# Patient Record
(demographics unavailable — no encounter records)

---

## 2025-05-06 NOTE — PHYSICAL EXAM
[Alert] : alert [No Acute Distress] : in no acute distress [Sclera] : the sclera and conjunctiva were normal [Normal Outer Ear/Nose] : the ears and nose were normal in appearance [Supple] : the neck was supple [No Respiratory Distress] : no respiratory distress [No Acc Muscle Use] : no accessory muscle use [Respiration, Rhythm And Depth] : normal respiratory rhythm and effort [Normal S1, S2] : normal S1 and S2 [Heart Rate And Rhythm] : heart rate was normal and rhythm regular [Bowel Sounds] : normal bowel sounds [Abdomen Tenderness] : non-tender [Abdomen Soft] : soft [Normal Gait] : abnormal gait [No Clubbing, Cyanosis] : no clubbing or cyanosis of the fingernails [Motor Tone] : muscle strength and tone were normal [No Focal Deficits] : no focal deficits [Normal Affect] : the affect was normal [Normal Mood] : the mood was normal

## 2025-05-06 NOTE — HISTORY OF PRESENT ILLNESS
[No falls in past year] : Patient reported no falls in the past year [0] : 2) Feeling down, depressed, or hopeless: Not at all (0) [PHQ-2 Negative - No further assessment needed] : PHQ-2 Negative - No further assessment needed [With Patient/Caregiver] : , with patient/caregiver [FreeTextEntry1] : PCP Dr. Khalida Zambrano   [facility] - [facility] Phone  [phone]  Gastro Dr. Benton Ly   Mr. FLOWER JONES is a 60 year old man with pmhx of migraines, essential tremors on propanolol, anxiety on citalopram, neuropathy, alcohol use disorder s/p naltrexone injection, cirrhosis with esophageal varices, anemia, gait instability ambulates with cane who presents for geriatric assessment.  Patient is seen at home of the [facility] living.   Patient is post discharge for [facility] for metabolic encephalopathy from cirrhosis. He is taking medications has prescribed. He had gout two weeks ago, has since resolved. He has follow up appt with rheumatologist and hepatologist on may 22, records to be faxed over.   Reviewed outside records, updated chart with history. He is taking medications as prescribed. Sent refill of medications. He needs refill for medication sent to facility, done today.   He has no complaints.   Social hx: Biochem professor. 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 no ilicit drugs or tobacco.  [[id number]] : 0 [AdvancecareDate] : 05/25 [FreeTextEntry4] : Has HCP, copy of form to be scanned to chart.

## 2025-05-06 NOTE — ASSESSMENT
[FreeTextEntry1] : Gait instability - start PT.   HM - healthy meals and snacks, and  physical activity as tolerated for weight maintenance. Vaccine information to be faxed over. Ophthalmology referral provided. Orders and referral provided as below. Outside records to be faxed over.    Patient is here for episodic care. All patient questions answered today and understood by patient. Henceforth, Patient to schedule follow up appointments to discuss results and update plan of care, and if new symptoms, questions, renewals or health concerns.   I have spent a total of 60 minutes or greater to prepare to see and discuss with the patient, obtaining and reviewing history and records, documenting physical examination, explaining the diagnosis, prognosis and treatment plan with the patient. I also have spent the time ordering studies and testing, interpreting results, medicine reconciliation, subspecialty consultation and documentation as above. Excluding time spent on separately billable services.

## 2025-05-13 NOTE — ASSESSMENT
[FreeTextEntry1] : Cirrhosis (571.5) (K74.60)Varices of esophagus determined by endoscopy (456.1) (I85.00) hx of esophageal varices. c/w propanolol. he is UTD with endoscopy and colonoscopy. monitoring for signs of bleeding. advised on judicious use of NSAIDs. checking labs to assess MELD score. c/w lactulose and bowel regimen, has 3+ bms a day.  Alcohol dependence (303.90) (F10.20) s/p nalteoxine injection. follow up with hepatologist for repeat injection, or consider pills.  records to be faxed over.  Essential tremor (333.1) (G25.0) stable. c/w propanolol.   Gout, arthritis (274.00) (M10.9) stop prednisone. follow up with rheumatologist. c/w allopurinol.    uric acid level 6.8.   Anemia, unspecified type (285.9) (D64.9) c/w multi vitamin. iron deficiency, consider iron infusion with hematologist.   Neuropathy (355.9) (G62.9) c/w gabapentin.  Knee pain - xray ordered. acetaminophen 650mg q8h prn.   Gait instability - c/w PT. ambulates with cane.   Leukocytosis - chronic, patient to see hematologist.   HM - healthy meals and snacks, and  physical activity as tolerated for weight maintenance. Vaccine information to be faxed over. Ophthalmology referral provided. Orders and referral provided as below. Outside records to be faxed over.    Patient is here for episodic care. All patient questions answered today and understood by patient. Henceforth, Patient to schedule follow up appointments to discuss results and update plan of care, and if new symptoms, questions, renewals or health concerns.   I have spent a total of 60 minutes or greater to prepare to see and discuss with the patient, obtaining and reviewing history and records, documenting physical examination, explaining the diagnosis, prognosis and treatment plan with the patient. I also have spent the time ordering studies and testing, interpreting results, medicine reconciliation, subspecialty consultation and documentation as above. Excluding time spent on separately billable services.

## 2025-05-13 NOTE — HISTORY OF PRESENT ILLNESS
[FreeTextEntry1] : Care team:  Prior PCP Dr. Khalida Zambrano  [facility] Phone  [phone]  Gastro/Hep  Dr. Benton Ly   Mr. FLOWER JONES is a 60 year old man with pmhx of migraines, essential tremors on propanolol, anxiety on citalopram, neuropathy, alcohol use disorder s/p naltrexone injection, cirrhosis with esophageal varices, hx of iron def anemia, gait instability ambulates with cane who presents for geriatric assessment.  Patient is seen at home of the [facility].   He complains of right knee pain. We discusseed past labs. He wants to take acetaminophen for knee pain, we discussed 650mg q8h prn option given he also uses this for headaches/migraines.   We discussed recent xray with heling toe fracture. we discussed adding vitamin d. folate level is elevated, stopping this and b12.   His liver function is improving. we are stopping rifaximin 550mg due to cost/prior authorization, can be restarted with hepatologist. we discussed alcohol injection with hepatologist.   patient was on steroids which can elevated wbc, but this is chronic. patient to see hematologist, also for iron infusion.   Patient is post discharge for [facility] for metabolic encephalopathy from cirrhosis. He is taking medications has prescribed. He had gout two weeks ago, has since resolved. He has follow up appt with rheumatologist and hepatologist on may 22, records to be faxed over.   Social hx: Biochem professor. 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 no ilicit drugs or tobacco.  [[id number]] : 0 [AdvancecareDate] : 05/25 [FreeTextEntry4] : Has HCP, copy of form to be scanned to chart.

## 2025-06-18 NOTE — HISTORY OF PRESENT ILLNESS
[Preoperative Visit] : for a medical evaluation prior to surgery [With Patient/Caregiver] : , with patient/caregiver [No falls in past year] : Patient reported no falls in the past year [Completely Independent] : Completely independent. [0] : 2) Feeling down, depressed, or hopeless: Not at all (0) [PHQ-2 Negative - No further assessment needed] : PHQ-2 Negative - No further assessment needed [Scheduled Procedure ___] : a [unfilled] [Surgeon Name ___] : surgeon: [unfilled] [Electrocardiogram] : ~T an ECG ~C was performed [Fair] : Fair [Fever] : no fever [Chills] : no chills [Dyspnea] : no dyspnea [FreeTextEntry1] : Care team:  Prior PCP Dr. Khalida Zambrano  [facility] - [facility] Phone  [phone]  Gastro/Hep  Dr. Benton Ly   Mr. FLOWER JONES is a 60 year old man with pmhx of migraines, essential tremors on propanolol, anxiety on citalopram, neuropathy, alcohol use disorder s/p naltrexone injection, cirrhosis withs, hx of iron def anemia, gait instability ambulates with cane, chronic joint pain, toe fracture who presents for pre-op.    He has follows with rheumatologist and hepatologist at [facility]. He is planning for endoscopy. EGD - april 2025- showing esophagitis, without esophageal varices.   Patient advised to ask surgeon about use of NSAIDs and acetaminophen for chronic pain. He is now on acamprosate. He wants try tramadol prn pain.   Patient has had surgery in the past, denied any adverse reaction to anesthesia, nor any bleed and clotting. Patient denied any dyspnea, chest pain, lightheadedness, nor wheezing with activity. He ambulates with walker due to pain in his foot.   Social hx: Biochem professor. 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 no ilicit drugs or tobacco.   [AdvancecareDate] : 05/25 [FreeTextEntry4] : HCP scanned into chart.  [[id number]] : 0

## 2025-06-18 NOTE — ASSESSMENT
[Procedure Low Risk] : the procedure risk is low [Patient Low Risk] : the patient is a low surgical risk [Optimized for Surgery] : the patient is optimized for surgery [As per surgery] : as per surgery [FreeTextEntry1] : preop - Patient is considered low risk for low risk procedure. No contraindication for planned procedure. Patient last INR on may 2025 was normal 1.09. Patient to continue off nsaids prior to surgery. He can use Tylenol as needed for pain. ECG - sinus bradycardia, hr 59, normal intervals, low voltate in precordial leads. mildy abnormal.   Cirrhosis (571.5) (K74.60) with esophagitis. c/w propanolol. pending repeat endoscopy for esophagitis. . monitoring for signs of bleeding. use of NSAIDs and acetaminophen prn to be discussed with hepatologist. MELD score improved since hospitalization . c/w lactulose and bowel regimen, has 3+ bms a day.  hx of toe fracture and rib fracture - ordered for bone density test.   Alcohol dependence (303.90) (F10.20) s/p nalteoxine injection. c/w acamprosate as per hepatology. alcohol center appt to be scheduled.   Essential tremor (333.1) (G25.0) stable. c/w propranolol.   Gout, arthritis (274.00) (M10.9) s/p oral steroids. uric acid at goal, continue allopurinol 400mg daily.  follow up with rheumatologist for assessment if pseudogout vs gout, and possible need for intrarticular injection. patient also has chronic pain across multiple joints, discussed fibromyalgia, neuropathy, vs complex regional pain disorder. c/w gabapentin.   Anemia, unspecified type (285.9) (D64.9)c/w multi vitamin. follow up with hematologist.   Gait instability - c/w PT. ambulates with cane.   Leukocytosis - chronic, patient to see hematologist. worsened by recent steroid use.   Risk and benefits of Prevnar vaccination discussed today. Patient opted for vaccination today. All questions answered.  HM - healthy meals and snacks, and  physical activity as tolerated for weight maintenance. Vaccine information to be faxed over. Ophthalmology referral provided. Orders and referral provided as below. Outside records to be faxed over.    Patient is here for episodic care. All patient questions answered today and understood by patient. Henceforth, Patient to schedule follow up appointments to discuss results and update plan of care, and if new symptoms, questions, renewals or health concerns.

## 2025-06-18 NOTE — HISTORY OF PRESENT ILLNESS
[Preoperative Visit] : for a medical evaluation prior to surgery [With Patient/Caregiver] : , with patient/caregiver [No falls in past year] : Patient reported no falls in the past year [Completely Independent] : Completely independent. [0] : 2) Feeling down, depressed, or hopeless: Not at all (0) [PHQ-2 Negative - No further assessment needed] : PHQ-2 Negative - No further assessment needed [Scheduled Procedure ___] : a [unfilled] [Surgeon Name ___] : surgeon: [unfilled] [Electrocardiogram] : ~T an ECG ~C was performed [Fair] : Fair [Fever] : no fever [Chills] : no chills [Dyspnea] : no dyspnea [FreeTextEntry1] : Care team:  Prior PCP Dr. Khalida Zambrano  [facility] - [facility] Phone  [phone]  Gastro/Hep  Dr. Benton Ly   Mr. FLOWER JONES is a 60 year old man with pmhx of migraines, essential tremors on propanolol, anxiety on citalopram, neuropathy, alcohol use disorder s/p naltrexone injection, cirrhosis withs, hx of iron def anemia, gait instability ambulates with cane, chronic joint pain, toe fracture who presents for pre-op.    He has follows with rheumatologist and hepatologist at [facility]. He is planning for endoscopy. EGD - april 2025- showing esophagitis, without esophageal varices.   Patient advised to ask surgeon about use of NSAIDs and acetaminophen for chronic pain. He is now on acamprosate. He wants try tramadol prn pain.   Patient has had surgery in the past, denied any adverse reaction to anesthesia, nor any bleed and clotting. Patient denied any dyspnea, chest pain, lightheadedness, nor wheezing with activity. He ambulates with walker due to pain in his foot.   Social hx: Biochem professor. 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 no ilicit drugs or tobacco.   [AdvancecareDate] : 05/25 [FreeTextEntry4] : HCP scanned into chart.  [[id number]] : 0

## 2025-06-18 NOTE — PHYSICAL EXAM
[Alert] : alert [Sclera] : the sclera and conjunctiva were normal [Normal Outer Ear/Nose] : the ears and nose were normal in appearance [No Respiratory Distress] : no respiratory distress [No Acc Muscle Use] : no accessory muscle use [No Focal Deficits] : no focal deficits [Normal Affect] : the affect was normal [Normal Mood] : the mood was normal

## 2025-07-22 NOTE — PHYSICAL EXAM
[General Appearance - Alert] : alert [General Appearance - In No Acute Distress] : in no acute distress [General Appearance - Well-Appearing] : healthy appearing [Oriented To Time, Place, And Person] : oriented to person, place, and time [Affect] : the affect was normal [Mood] : the mood was normal [Cranial Nerves Facial (VII)] : face symmetrical [Cranial Nerves Accessory (XI - Cranial And Spinal)] : head turning and shoulder shrug symmetric [Cranial Nerves Hypoglossal (XII)] : there was no tongue deviation with protrusion [Motor Strength] : muscle strength was normal in all four extremities [Paresis Pronator Drift Right-Sided] : no pronator drift on the right [Paresis Pronator Drift Left-Sided] : no pronator drift on the left [Motor Strength Upper Extremities Bilaterally] : strength was normal in both upper extremities [Motor Strength Lower Extremities Bilaterally] : strength was normal in both lower extremities [Sensation Tactile Decrease] : light touch was intact [Coordination - Dysmetria Impaired Finger-to-Nose Bilateral] : not present [2+] : Brachioradialis left 2+ [Sclera] : the sclera and conjunctiva were normal [PERRL With Normal Accommodation] : pupils were equal in size, round, reactive to light, with normal accommodation [Extraocular Movements] : extraocular movements were intact [] : no respiratory distress
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
## 2025-07-22 NOTE — ASSESSMENT
[FreeTextEntry1] : Hx of migraine , seems to have decreased in frequency since hospitalization for liver failure in April 2025  Taking Ubrelvy 50mg prn  Discussed h/a hygiene and triggers  ETO 6 mo.

## 2025-07-29 NOTE — ASSESSMENT
[FreeTextEntry1] : Cirrhosis (571.5) (K74.60) with esophagitis. c/w propanolol. pending repeat endoscopy for esophagitis.. monitoring for signs of bleeding. use of NSAIDs and acetaminophen prn to be discussed with hepatologist. MELD score improved since hospitalization. c/w lactulose and bowel regimen, has 3+ bms a day. pt is off rifaxamin. he is not taking iron, stopping vitamin c. esophagitis c/w pantoprazole therapy.   hx of toe fracture and rib fracture - ordered for bone density test in the past.   Alcohol dependence (303.90) (F10.20) s/p nalteoxine injection. c/w acamprosate as per hepatology. alcohol center appt to be scheduled.  Essential tremor (333.1) (G25.0) stable. c/w propranolol.  migraine - have improved with propanaol. using ubrelvy prn. saw neurologist.   Gout, arthritis (274.00) (M10.9) s/p oral steroids. uric acid at goal, continue allopurinol 400mg daily. follow up with rheumatologist for assessment if pseudogout vs gout, and possible need for intrarticular injection. patient also has chronic pain across multiple joints, discussed fibromyalgia, neuropathy, vs complex regional pain disorder. c/w gabapentin. possible flare now, ordered for medrol dose pack today.   Anemia, unspecified type (285.9) (D64.9)c/w multi vitamin. follow up with hematologist.stopping vitamin C.   Gait instability - c/w PT. ambulates with cane.  Leukocytosis - chronic, patient to see hematologist. worsened by recent steroid use.  HM - healthy meals and snacks, and physical activity as tolerated for weight maintenance. Vaccine information to be faxed over. Ophthalmology referral provided in the past. . Orders and referral provided as below. Outside records to be faxed over.

## 2025-07-29 NOTE — HISTORY OF PRESENT ILLNESS
[FreeTextEntry1] : Care team:  Prior PCP Dr. Khalida Zambrano  [facility] - [facility] Phone  [phone]  Gastro/Hep  Dr. Benton Ly   Mr. FLOWER JONES is a 60 year old man with pmhx of migraines, essential tremors on propanolol, anxiety on citalopram, neuropathy, alcohol use disorder s/p naltrexone injection, cirrhosis withs, hx of iron def anemia, gait instability ambulates with cane, chronic joint pain, toe fracture who presents for pre-op.    He has follows with rheumatologist and hepatologist at [facility]. EGD - april 2025- showing esophagitis, without esophageal varices. He had endoscopy recently, stable varices.   He had arthritis, possible from gout vs pseudogout. He feels he is having flare and difficulty getting out of bed. Allopurinol increased by rheumatologist. He wants medrol dose pack. for relief.   Social hx: Biochem professor. 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 no ilicit drugs or tobacco.   [AdvancecareDate] : 05/25 [FreeTextEntry4] : HCP scanned into chart.

## 2025-07-29 NOTE — HISTORY OF PRESENT ILLNESS
[FreeTextEntry1] : Care team:  Prior PCP Dr. Khalida Zambrano  [facility] - [facility] Phone  [phone]  Gastro/Hep  Dr. Benton Ly   Mr. FLOWER JONES is a 60 year old man with pmhx of migraines, essential tremors on propanolol, anxiety on citalopram, neuropathy, alcohol use disorder s/p naltrexone injection, cirrhosis withs, hx of iron def anemia, gait instability ambulates with cane, chronic joint pain, toe fracture who presents for pre-op.    He has follows with rheumatologist and hepatologist at [facility]. EGD - april 2025- showing esophagitis, without esophageal varices. He had endoscopy recent.   He had arthritis, possible from gout vs pseudogout. He feels he is having flare and difficulty getting out of bed.   Allopurinol increased by rheumatologist. He wants medrol dose pack. for relief.   Social hx: Biochem professor. 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 no ilicit drugs or tobacco.   [No falls in past year] : Patient reported no falls in the past year [Completely Independent] : Completely independent. [0] : 2) Feeling down, depressed, or hopeless: Not at all (0) [PHQ-2 Negative - No further assessment needed] : PHQ-2 Negative - No further assessment needed [[id number]] : 0 [With Patient/Caregiver] : , with patient/caregiver [AdvancecareDate] : 05/25 [FreeTextEntry4] : HCP scanned into chart.